# Patient Record
(demographics unavailable — no encounter records)

---

## 2025-05-06 NOTE — DISCUSSION/SUMMARY
[FreeTextEntry1] : 1. PAF:  s/p 3 ablation (most recently 2022), s/p ILR and subsequently explanted. Patient states he was previously symptomatic to his PAF episodes, and he hasn't had an episode in a few years. No AC required at this time.  2. OWUSU: recommend stress echocardiogram and TTE.  Office will call with results  Follow up in 1 year. [EKG obtained to assist in diagnosis and management of assessed problem(s)] : EKG obtained to assist in diagnosis and management of assessed problem(s)

## 2025-05-06 NOTE — HISTORY OF PRESENT ILLNESS
[FreeTextEntry1] : 66 year old male with PMHx of PAF s/p 3 ablation (most recently 2022), s/p ILR and subsequently explanted, used to follow with Dr. Rolando Summers, but hasn't seen him in over 2 years, presents to establish care with a new cardiologist. Patient has been having OWUSU. He normally is able to go on a 3 mile run with no issues. Over the winter, he noted OWUSU mid way into his 3 mile run. Was eventually diagnosed with sinus infection. He was treated. This has improved his OWUSU, however, it is still occurring. No chest pain or pressure.  There is no history of MI, CVA, CHF, or previous coronary intervention.

## 2025-05-06 NOTE — PHYSICAL EXAM
[Normal] : moves all extremities, no focal deficits, normal speech [de-identified] :  No carotid bruits auscultated bilaterally.